# Patient Record
(demographics unavailable — no encounter records)

---

## 2024-12-14 NOTE — CONSULT LETTER
[Dear  ___] : Dear  [unfilled], [Consult Letter:] : I had the pleasure of evaluating your patient, [unfilled]. [Please see my note below.] : Please see my note below. [Consult Closing:] : Thank you very much for allowing me to participate in the care of this patient.  If you have any questions, please do not hesitate to contact me. [Sincerely,] : Sincerely, [FreeTextEntry2] :  Dr. Ruben Montilla [FreeTextEntry3] : Kieran Carey MD, PhD\par  Chief, Division of Laryngology\par  Department of Otolaryngology\par  [facility]\par  Pediatric Otolaryngology, [facility]\par   of Otolaryngology\par  [facility]

## 2024-12-14 NOTE — REASON FOR VISIT
[Home] : at home, [unfilled] , at the time of the visit. [Patient] : the patient [Subsequent Evaluation] : a subsequent evaluation for [FreeTextEntry2] : dysphonia

## 2024-12-14 NOTE — HISTORY OF PRESENT ILLNESS
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  s/p thyroidectomy (right lobe) in August 2021 at [facility] for goiter which was causing tracheal compression.  States breathing fluctuates "good days and bad days depending on activity", still SOB with exertion and with phonating.  Denies dysphagia, throat discomfort and dyspnea at rest.  States getting over a cold-did not need any antibiotics.  Denies sinus pressure/pain Denies otalgia, otorrhea, ear infections, hearing loss, tinnitus, dizziness, vertigo, headaches related to hearing.  Sees GI Dr. Andrez Crawford ()- States had upper EGD 4/2024 and was diagnosed with David's esophagus- was on omeprazole but discontinued due to pain in kidneys- abdominal ultrasound today at [facility].  Could not tolerate omeprazole or famotidine Still having reflux symptoms Previous injection 9/12/23

## 2025-03-21 NOTE — HISTORY OF PRESENT ILLNESS
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  s/p thyroidectomy (right lobe) in August 2021 at [facility] for goiter which was causing tracheal compression.  Started to feel SOB with exertion. Voice is stable  Denies dysphagia, throat discomfort and dyspnea at rest. Taking Famotidine 20mg at bedtime.   Denies sinus pressure/pain Denies otalgia, otorrhea, ear infections, hearing loss, tinnitus, dizziness, vertigo, headaches related to hearing.  Previous injection 9/12/23 No recent illness.

## 2025-03-21 NOTE — ADDENDUM
[FreeTextEntry1] :  Documented by Radha Vazqeuz acting as scribe for Dr. Carey on 03/21/2025. All Medical record entries made by the Scribe were at my, Dr. Carey, direction and personally dictated by me on 03/21/2025 . I have reviewed the chart and agree that the record accurately reflects my personal performance of the history, physical exam, assessment and plan. I have also personally directed, reviewed, and agreed with the discharge instructions.

## 2025-04-05 NOTE — DISCUSSION/SUMMARY
[de-identified] : 46 Y F w/ chronic cervicalgia   X-ray today demonstrates reversal of cervical lordosis. Mild loss of disc height C4/ 5 Discussed conservative treatments in the form of muscle relaxer/ NSAIDs, physical therapy exercises, and TPIS/ acupuncture. Patient was provided with a referral for cervical physical therapy to work on stretching, strengthening and range of motion. Referred to Dr. Thompson for acupuncture.  I am prescribing the patient Meloxicam (15mg x7-10 days then 7.5mg up to BID PRN) for pain relief. Titration Schedule Provided. Patient given prescription for methocarbamol. Medication management and risks reviewed.   F/u 6 weeks   Prior to appointment and during encounter with patient extensive medical records were reviewed including but not limited to, hospital records, outpatient records, imaging results, and lab data. During this appointment the patient was examined, diagnoses were discussed and explained in a face to face manner. In addition extensive time was spent reviewing aforementioned diagnostic studies. Counseling including abnormal image results, differential diagnoses, treatment options, risk and benefits, lifestyle changes, current condition, and current medications was performed. Patient's comments, questions, and concerns were addressed and patient verbalized understanding. Based on this patient's presentation at our office, which is an orthopedic spine surgeon's office, this patient inherently / intrinsically has a risk, however minute, of developing issues such as Cauda equina syndrome, bowel and bladder changes, or progression of motor or neurological deficits such as paralysis which may be permanent.   MARGARITA GRECO Acting as a Scribe for Dr. Poli FRIEND, Margarita Greco, attest that this documentation has been prepared under the direction and in the presence of Provider Jt Ashton MD.

## 2025-04-05 NOTE — HISTORY OF PRESENT ILLNESS
[de-identified] : 04/04/2025 - 46 Y M presents for initial evaluation of recurring episodes of chronic neck pain worse over the last 3 months. Usually manages pain with stretching, yoga, and massages but finds this has no longer been as effective. No inciting event or trauma. Pain is localized to the neck without radiation into the extremities. Does not take medications for pain.    Injury Details The patient is a 46 year female who presents today complaining of neck pain Date of Injury/Onset: chronic Pain:    At Rest: 4/10  With Activity:  4/10  Mechanism of injury: NKI Quality of symptoms: pressure, tightness Improves with: nothing Worse with: same with activity or at rest Prior treatment: stretching, massage, heat Prior Imaging: no Additional Information: None

## 2025-04-05 NOTE — DATA REVIEWED
[FreeTextEntry1] : On my interpretation of these images in office x-rays Cervical spine ap/lat 04/04/2025  Reversal of cervical lordosis. Mild loss of disc height C4/ 5  I stop paperwork reviewed BRAN progress notes reviewed

## 2025-04-05 NOTE — DISCUSSION/SUMMARY
[de-identified] : 46 Y F w/ chronic cervicalgia   X-ray today demonstrates reversal of cervical lordosis. Mild loss of disc height C4/ 5 Discussed conservative treatments in the form of muscle relaxer/ NSAIDs, physical therapy exercises, and TPIS/ acupuncture. Patient was provided with a referral for cervical physical therapy to work on stretching, strengthening and range of motion. Referred to Dr. Thompson for acupuncture.  I am prescribing the patient Meloxicam (15mg x7-10 days then 7.5mg up to BID PRN) for pain relief. Titration Schedule Provided. Patient given prescription for methocarbamol. Medication management and risks reviewed.   F/u 6 weeks   Prior to appointment and during encounter with patient extensive medical records were reviewed including but not limited to, hospital records, outpatient records, imaging results, and lab data. During this appointment the patient was examined, diagnoses were discussed and explained in a face to face manner. In addition extensive time was spent reviewing aforementioned diagnostic studies. Counseling including abnormal image results, differential diagnoses, treatment options, risk and benefits, lifestyle changes, current condition, and current medications was performed. Patient's comments, questions, and concerns were addressed and patient verbalized understanding. Based on this patient's presentation at our office, which is an orthopedic spine surgeon's office, this patient inherently / intrinsically has a risk, however minute, of developing issues such as Cauda equina syndrome, bowel and bladder changes, or progression of motor or neurological deficits such as paralysis which may be permanent.   MARGARITA GRECO Acting as a Scribe for Dr. Poli FRIEND, Margarita Greco, attest that this documentation has been prepared under the direction and in the presence of Provider Jt Ashton MD.

## 2025-04-05 NOTE — HISTORY OF PRESENT ILLNESS
[de-identified] : 04/04/2025 - 46 Y M presents for initial evaluation of recurring episodes of chronic neck pain worse over the last 3 months. Usually manages pain with stretching, yoga, and massages but finds this has no longer been as effective. No inciting event or trauma. Pain is localized to the neck without radiation into the extremities. Does not take medications for pain.    Injury Details The patient is a 46 year female who presents today complaining of neck pain Date of Injury/Onset: chronic Pain:    At Rest: 4/10  With Activity:  4/10  Mechanism of injury: NKI Quality of symptoms: pressure, tightness Improves with: nothing Worse with: same with activity or at rest Prior treatment: stretching, massage, heat Prior Imaging: no Additional Information: None

## 2025-05-01 NOTE — HISTORY OF PRESENT ILLNESS
[FreeTextEntry1] : Patient is a 46-year-old female who presents for evaluation of chronic neck pain.  Patient reports for many years she has felt a "tightness" involving the right side of her neck.  However, over the past 3 months her right-sided neck pain has been increasing in severity and become more persistent.  Patient is unaware of any precipitating factor, however  She does care for her grandchild and lifts him regularly.  patient denies any radicular symptoms involving the right upper extremity.  She reports she has been using moist heat for muscle relaxation and takes Advil as needed. Patient has recently been evaluated by Dr. Ashton x-rays of the cervical spine have been obtained.  Patient was prescribed meloxicam and methocarbamol and a course of physical therapy was recommended patient has not initiated medication or physical therapy as of yet as she was on vacation.  Patient was referred to my office today for evaluation of TPI/acupuncture treatment to address myofascial pain component of her cervical spine complaints.  Patient rates her pain level at a 5 out of 10 at rest and a 7 out of 10 with activity.

## 2025-05-01 NOTE — PHYSICAL EXAM
[FreeTextEntry1] : EXAMINATION OF THE CERVICAL SPINE AND UPPER EXTREMITIES:  Patient is alert and cooperative and answers all questions appropriately. Cranial nerve testing was intact.  Smell and taste were not tested. Visual fields were full. There was no difficulty with finger to nose response. Good rapid alternating digits of the hands bilaterally.  patient maintains her head in a mild right lateral tilt position Romberg testing was negative. There was no dysmetria of the upper extremities. Reflexes revealed 2+ and symmetrical MMT U/E: no gross atrophy Sensory examination revealed sensation was intact. Vibratory and proprioceptive testing were intact. Peripheral pulses were palpable bilaterally. Castaneda Test was negative bilaterally. Tinels Test was negative at the wrists bilaterally. The Spurling Cervical Compression Test was negative. The Adsons Maneuver was negative bilaterally. No scapular winging was noted.  There was good scapular mobility.   Range of motion testing was performed with the use of a goniometer. On range of motion testing, Flexion was to 45 degrees (normal - 45) Extension was to 40 degrees (normal - 55) Left rotation was to 70 degrees (normal - 70) Right rotation was to 65 degrees (normal - 70) Left lateral bending was to 35 degrees (normal - 40) Right lateral bending was to 25 degrees (normal - 40)    On palpation, of the cervical spine there was tenderness involving the C4-C4 spinus processes, right occiput is tender, tenderness and spasm involving the right cervical paraspinal musculature, trigger points right trapezius musculature, tenderness and spasm involving the levator scapula musculature

## 2025-05-01 NOTE — ASSESSMENT
[FreeTextEntry1] : Patient advised to initiate meloxicam and methocarbamol PT 2-3xweekly/x4 weeks - C/S MH, ES, DTM - C/S special attn: right side  Trapezii and rhomboid stretch and strengthening  Scapular mobilization Isometrics: C/S Upper extremities PRE'S advanced as tolerated.  Instruction in proper posturing and body mechanics. Instruction in HEP. Moist heat for muscle relaxation Patient advised to obtain cervical pillow  Reviewed home exercise program with patient.  Stressed the importance of cervical spine range of motion trap and rhomboid stretching scapular mobilization isometrics to the C-spine, proper posturing and body mechanics as well as ergonomics Reeval in 4 weeks if symptoms persist will consider initiation of acupuncture/TPI therapy to her cervical spine Goals of which are to decrease pain, dissipate muscle spasm, increase ROM and improve level of function.   At least 60 minutes was spent with patient face to face examining patient, reviewing findings/results, counseling patient and coordinating treatment program. Ample time was provided to answer any questions or address concerns to the patients satisfaction.